# Patient Record
Sex: MALE | Race: WHITE | NOT HISPANIC OR LATINO | Employment: OTHER | URBAN - METROPOLITAN AREA
[De-identification: names, ages, dates, MRNs, and addresses within clinical notes are randomized per-mention and may not be internally consistent; named-entity substitution may affect disease eponyms.]

---

## 2019-05-17 ENCOUNTER — TRANSCRIBE ORDERS (OUTPATIENT)
Dept: ADMINISTRATIVE | Facility: HOSPITAL | Age: 64
End: 2019-05-17

## 2019-05-17 DIAGNOSIS — N63.0 UNSPECIFIED LUMP IN UNSPECIFIED BREAST: Primary | ICD-10-CM

## 2019-06-10 ENCOUNTER — APPOINTMENT (OUTPATIENT)
Dept: MAMMOGRAPHY | Facility: HOSPITAL | Age: 64
End: 2019-06-10

## 2019-07-03 ENCOUNTER — APPOINTMENT (OUTPATIENT)
Dept: MAMMOGRAPHY | Facility: HOSPITAL | Age: 64
End: 2019-07-03

## 2019-07-05 ENCOUNTER — HOSPITAL ENCOUNTER (OUTPATIENT)
Dept: MAMMOGRAPHY | Facility: HOSPITAL | Age: 64
Discharge: HOME | End: 2019-07-05
Admitting: INTERNAL MEDICINE

## 2019-07-05 DIAGNOSIS — N63.0 UNSPECIFIED LUMP IN UNSPECIFIED BREAST: ICD-10-CM

## 2019-07-05 PROCEDURE — 77066 DX MAMMO INCL CAD BI: CPT

## 2019-07-05 PROCEDURE — G0279 TOMOSYNTHESIS, MAMMO: HCPCS

## 2019-07-05 PROCEDURE — 77066 DX MAMMO INCL CAD BI: CPT | Performed by: RADIOLOGY

## 2019-07-05 PROCEDURE — G0279 TOMOSYNTHESIS, MAMMO: HCPCS | Performed by: RADIOLOGY

## 2022-05-02 ENCOUNTER — HOSPITAL ENCOUNTER (INPATIENT)
Dept: HOSPITAL 79 - PROG CARE | Age: 67
LOS: 1 days | Discharge: LEFT BEFORE DISCHARGE | DRG: 70 | End: 2022-05-03
Attending: INTERNAL MEDICINE | Admitting: INTERNAL MEDICINE
Payer: MEDICARE

## 2022-05-02 VITALS — WEIGHT: 199 LBS | BODY MASS INDEX: 29.47 KG/M2 | HEIGHT: 69 IN

## 2022-05-02 DIAGNOSIS — Z79.01: ICD-10-CM

## 2022-05-02 DIAGNOSIS — N18.6: ICD-10-CM

## 2022-05-02 DIAGNOSIS — G93.41: Primary | ICD-10-CM

## 2022-05-02 DIAGNOSIS — I25.10: ICD-10-CM

## 2022-05-02 DIAGNOSIS — Z79.82: ICD-10-CM

## 2022-05-02 DIAGNOSIS — G89.4: ICD-10-CM

## 2022-05-02 DIAGNOSIS — E87.5: ICD-10-CM

## 2022-05-02 DIAGNOSIS — K21.9: ICD-10-CM

## 2022-05-02 DIAGNOSIS — I12.0: ICD-10-CM

## 2022-05-02 DIAGNOSIS — E78.5: ICD-10-CM

## 2022-05-02 DIAGNOSIS — N40.0: ICD-10-CM

## 2022-05-02 DIAGNOSIS — E03.9: ICD-10-CM

## 2022-05-02 DIAGNOSIS — Z88.8: ICD-10-CM

## 2022-05-02 DIAGNOSIS — E87.2: ICD-10-CM

## 2022-05-02 DIAGNOSIS — Z20.822: ICD-10-CM

## 2022-05-02 DIAGNOSIS — Z91.15: ICD-10-CM

## 2022-05-02 DIAGNOSIS — E87.1: ICD-10-CM

## 2022-05-02 DIAGNOSIS — Z95.1: ICD-10-CM

## 2022-05-02 DIAGNOSIS — M62.838: ICD-10-CM

## 2022-05-02 DIAGNOSIS — G62.9: ICD-10-CM

## 2022-05-02 PROCEDURE — U0002 COVID-19 LAB TEST NON-CDC: HCPCS

## 2022-05-02 PROCEDURE — 5A1D70Z PERFORMANCE OF URINARY FILTRATION, INTERMITTENT, LESS THAN 6 HOURS PER DAY: ICD-10-PCS | Performed by: INTERNAL MEDICINE

## 2022-05-03 NOTE — NUR
PT REFUSING ALL CARE EXCEPT FOR HIS MEDICINE TODAY. HE IS ALERT AND ORIENTED.
STATES, " I WANT TO LEAVE." NOTIFIED DR MARTINS OF PTS COMPLAINTS.